# Patient Record
(demographics unavailable — no encounter records)

---

## 2025-01-08 NOTE — HISTORY OF PRESENT ILLNESS
[FreeTextEntry1] : 71 yo female here for repeat bw and bp chk states has noted some high bps when monitoring at home no longer taking hct daily due to declining GFR, takes prn w/ leg swelling after flying admits to 2 slices of muenster cheese daily

## 2025-01-08 NOTE — PHYSICAL EXAM
[No Acute Distress] : no acute distress [Normal Sclera/Conjunctiva] : normal sclera/conjunctiva [EOMI] : extraocular movements intact [Normal Outer Ear/Nose] : the outer ears and nose were normal in appearance [No JVD] : no jugular venous distention [Normal] : normal rate, regular rhythm, normal S1 and S2 and no murmur heard [Coordination Grossly Intact] : coordination grossly intact [Normal Affect] : the affect was normal [Alert and Oriented x3] : oriented to person, place, and time [Normal Insight/Judgement] : insight and judgment were intact [de-identified] : erythema noted to b/l LE

## 2025-03-06 NOTE — HEALTH RISK ASSESSMENT
[Yes] : Yes [Monthly or less (1 pt)] : Monthly or less (1 point) [1 or 2 (0 pts)] : 1 or 2 (0 points) [Never (0 pts)] : Never (0 points) [0] : 2) Feeling down, depressed, or hopeless: Not at all (0) [PHQ-2 Negative - No further assessment needed] : PHQ-2 Negative - No further assessment needed [No] : In the past 12 months have you used drugs other than those required for medical reasons? No [de-identified] : denying  [Audit-CScore] : 1 [[id number]] : 0

## 2025-03-06 NOTE — HEALTH RISK ASSESSMENT
[Yes] : Yes [Monthly or less (1 pt)] : Monthly or less (1 point) [1 or 2 (0 pts)] : 1 or 2 (0 points) [Never (0 pts)] : Never (0 points) [0] : 2) Feeling down, depressed, or hopeless: Not at all (0) [PHQ-2 Negative - No further assessment needed] : PHQ-2 Negative - No further assessment needed [No] : In the past 12 months have you used drugs other than those required for medical reasons? No [de-identified] : denying  [Audit-CScore] : 1 [[id number]] : 0

## 2025-03-06 NOTE — HISTORY OF PRESENT ILLNESS
[FreeTextEntry1] : follow up  [de-identified] : 03/05/2025   70 year old female   presents for follow up  C/o: BP check  stopped hCTZ-- restarted started on olmesartan  PMH : HTN , breast ca, neuropathy, HLD, ,  PSH: breast lumpectomy, colon resection , tooth extraction, knee surgery   Allergies: PCN- anaphylactic rx/ strawberries- Hives Meds: olmesartan 20, rosuvastatin 10, , gabapentin  metoprolol 50 - am  hctz prn only  naltrex- helps with pain s/p fall 4 yrs ago - saw pod specialist  sees neurologist- Dr Hartmann- 400 bid  Supplements: multiv , fish oil align probiotic-      2022 68 yr old female presents for follow up health maintenance:  received flu vaccine-   Allergies:  PCN- anaphylactic rx strawberries- Hives

## 2025-03-06 NOTE — PLAN
[FreeTextEntry1] : All problems, medications and allergies were reviewed with the patient and any adjustments necessary were made. The patients' blood was drawn in office and will be followed and assessed for any issues. Patient was told to notify to the office if any issues arise. Patient agreeable with plan    continue BP medication   educated re S&S of hyper and hypo tension   and low salt low fat diet   Bp log  monitor and report

## 2025-03-06 NOTE — HISTORY OF PRESENT ILLNESS
[FreeTextEntry1] : follow up  [de-identified] : 03/05/2025   70 year old female   presents for follow up  C/o: BP check  stopped hCTZ-- restarted started on olmesartan  PMH : HTN , breast ca, neuropathy, HLD, ,  PSH: breast lumpectomy, colon resection , tooth extraction, knee surgery   Allergies: PCN- anaphylactic rx/ strawberries- Hives Meds: olmesartan 20, rosuvastatin 10, , gabapentin  metoprolol 50 - am  hctz prn only  naltrex- helps with pain s/p fall 4 yrs ago - saw pod specialist  sees neurologist- Dr Hartmann- 400 bid  Supplements: multiv , fish oil align probiotic-      2022 68 yr old female presents for follow up health maintenance:  received flu vaccine-   Allergies:  PCN- anaphylactic rx strawberries- Hives

## 2025-05-13 NOTE — HISTORY OF PRESENT ILLNESS
[Result of repetitive motion] : result of repetitive motion [7] : 7 [0] : 0 [Dull/Aching] : dull/aching [Tightness] : tightness [Occasional] : occasional [Social interactions] : social interactions [Retired] : Work status: retired [de-identified] : 05/13/2025: KAREN RUBINSTEIN, a 71 year old female, presents today for left ankle pain for a few weeks without trauma. [] : Post Surgical Visit: no [FreeTextEntry5] : pain for 2 weeks , no injury , swelling just at night  history of neuropathy   [FreeTextEntry6] : weak

## 2025-05-13 NOTE — DISCUSSION/SUMMARY
[de-identified] : The patient has one or more conditions that would benefit from physical therapy.  The physical therapy is requested to improve any deficit in pain, range of motion, strength and other problems in the affected body part(s) as noted in the physical examination above.  A prescription for physical therapy 2 - 3 times per week for 6 weeks was prescribed for the following body parts. Left ankle.   The patient's orthopaedic condition(s) warrants intermittent use of a prescription strength non-steroidal anti-inflammatory medication (IBUPROFEN 600MG Po Bid PRN).  This medication is associated with risks including but not limited to gastrointestinal irritation, kidney damage, hypertension, and bleeding.  The patient understands and will take medication as prescribed.  The patient will stop the medication and consult a physician as needed if problems arise.  Will schedule the patient for a doppler of the left lower extremity to assess the problem pending insurance authorization if necessary.  The patient has continued symptoms despite various treatment modalities.  The doppler will help to clarify the diagnosis and subsequent treatment plan - rule out DVT.   Follow up in 4-6 weeks.  Will consider MRI if pain persists.  Will consider MRI if symptoms persist.

## 2025-05-13 NOTE — ASSESSMENT
[FreeTextEntry1] : Left ankle pain for 2 weeks with no injury. Minimal pain but swelling and weakness per patient. Possible peroneal tendon tear, possible CRPS flare up, possible DVT.  Patient states she "erythromelalgia" and treated with neuro which causes chronic redness. .  At this point, this is a new condition where the diagnosis and the long-term prognosis is uncertain.  Retired.  HTN, neuropathy and erythromelalgia vs CPRS in feet after broke right knee - treated by neuro.

## 2025-05-13 NOTE — PHYSICAL EXAM
[Left] : left foot and ankle [Mild] : mild diffused ankle swelling [NL (20)] : dorsiflexion 20 degrees [NL (40)] : plantar flexion 40 degrees [4___] : [facility] 4[unfilled]/5 [2+] : dorsalis pedis pulse: 2+ [] : Sensation present to light touch in all distributions [FreeTextEntry3] : MIld erythema but bilaterally [TWNoteComboBox7] : dorsiflexion 5 degrees [de-identified] : plantar flexion 25 degrees [de-identified] : inversion 20 degrees [de-identified] : eversion 10 degrees

## 2025-07-01 NOTE — HISTORY OF PRESENT ILLNESS
[8] : 8 [4] : 4 [Dull/Aching] : dull/aching [de-identified] : R hand/ wrist pain after falling on it last night    [FreeTextEntry1] : R hand/wrist

## 2025-07-01 NOTE — ASSESSMENT
[FreeTextEntry1] : This is an acute uncomplicated injury that needs brace, ice and NSAIDs Wrist brace provided  I recommend the patient take over the counter medication such as Advil/Motrin/Aleve or Tylenol for pain and inflammation I rec the patient ice the affected body part for 10-15mins 3-4 times a day for 5 days to reduce pain, swelling, and inflammation  Return prn   
no ear pain/no nasal congestion/no vertigo

## 2025-07-01 NOTE — REASON FOR VISIT
[FreeTextEntry2] : 07/01/2025: 71-year-old female here today for: new injury R hand/ wrist pain. Went to  a pizza last night, that has a big box. Box got caught in the door and she fell. HAs been having a lot of pain since.

## 2025-07-27 NOTE — PLAN
[FreeTextEntry1] : All problems, medications and allergies were reviewed with the patient and any adjustments necessary were made. The patients' blood was drawn in office and will be followed and assessed for any issues. Patient was told to notify to the office if any issues arise. Patient agreeable with plan    continue BP medication   educated re S&S of hyper and hypo tension   and low salt low fat diet   Bp log  monitor and report  cardiology - will return -

## 2025-07-27 NOTE — HISTORY OF PRESENT ILLNESS
[FreeTextEntry8] : Jul 25 2025  9:00AM   71 year  old female      presents for acute care visit  decreased appetite, bloated,  usually skips breakfast-  has protein for lunch omeprazole every other day  for acid reflux symptoms   - 2023- colonoscopy- 5 yr recall BMs- regular   - thy nodules- sees Dr Kidd- does serial US   PMH : HTN , breast ca, neuropathy, HLD, , PSH: breast lumpectomy, colon resection , tooth extraction, knee surgery  Allergies: PCN- anaphylactic rx/ strawberries- Hives Meds: olmesartan 20, rosuvastatin 10, , gabapentin metoprolol 50 - am hctz prn only saline nasal spray prn -  naltrex- helps with pain s/p fall 4 yrs ago - saw pod specialist sees neurologist- Dr Hartmann- 400 bid Supplements: multiv , fish oil align probiotic-  C/o:  feeling forgetful - but denying any others noticing any memory impairment   denying snoring -   [de-identified] : 03/05/2025   70 year old female   presents for follow up  C/o: BP check  stopped hCTZ-- restarted started on olmesartan  PMH : HTN , breast ca, neuropathy, HLD, ,  PSH: breast lumpectomy, colon resection , tooth extraction, knee surgery   Allergies: PCN- anaphylactic rx/ strawberries- Hives Meds: olmesartan 20, rosuvastatin 10, , gabapentin  metoprolol 50 - am  hctz prn only  naltrex- helps with pain s/p fall 4 yrs ago - saw pod specialist  sees neurologist- Dr Hartmann- 400 bid  Supplements: multiv , fish oil align probiotic-      2022 68 yr old female presents for follow up health maintenance:  received flu vaccine-   Allergies:  PCN- anaphylactic rx strawberries- Hives

## 2025-07-27 NOTE — HEALTH RISK ASSESSMENT
[Yes] : Yes [Monthly or less (1 pt)] : Monthly or less (1 point) [1 or 2 (0 pts)] : 1 or 2 (0 points) [Never (0 pts)] : Never (0 points) [No] : In the past 12 months have you used drugs other than those required for medical reasons? No [0] : 2) Feeling down, depressed, or hopeless: Not at all (0) [PHQ-2 Negative - No further assessment needed] : PHQ-2 Negative - No further assessment needed [de-identified] : denying  [Audit-CScore] : 1 [de-identified] : bike and treadmill at gym,   [de-identified] : regular [[id number]] : 0

## 2025-07-27 NOTE — HISTORY OF PRESENT ILLNESS
[FreeTextEntry8] : Jul 25 2025  9:00AM   71 year  old female      presents for acute care visit  decreased appetite, bloated,  usually skips breakfast-  has protein for lunch omeprazole every other day  for acid reflux symptoms   - 2023- colonoscopy- 5 yr recall BMs- regular   - thy nodules- sees Dr Kidd- does serial US   PMH : HTN , breast ca, neuropathy, HLD, , PSH: breast lumpectomy, colon resection , tooth extraction, knee surgery  Allergies: PCN- anaphylactic rx/ strawberries- Hives Meds: olmesartan 20, rosuvastatin 10, , gabapentin metoprolol 50 - am hctz prn only saline nasal spray prn -  naltrex- helps with pain s/p fall 4 yrs ago - saw pod specialist sees neurologist- Dr Hartmann- 400 bid Supplements: multiv , fish oil align probiotic-  C/o:  feeling forgetful - but denying any others noticing any memory impairment   denying snoring -   [de-identified] : 03/05/2025   70 year old female   presents for follow up  C/o: BP check  stopped hCTZ-- restarted started on olmesartan  PMH : HTN , breast ca, neuropathy, HLD, ,  PSH: breast lumpectomy, colon resection , tooth extraction, knee surgery   Allergies: PCN- anaphylactic rx/ strawberries- Hives Meds: olmesartan 20, rosuvastatin 10, , gabapentin  metoprolol 50 - am  hctz prn only  naltrex- helps with pain s/p fall 4 yrs ago - saw pod specialist  sees neurologist- Dr Hartmann- 400 bid  Supplements: multiv , fish oil align probiotic-      2022 68 yr old female presents for follow up health maintenance:  received flu vaccine-   Allergies:  PCN- anaphylactic rx strawberries- Hives

## 2025-07-27 NOTE — HEALTH RISK ASSESSMENT
[Yes] : Yes [Monthly or less (1 pt)] : Monthly or less (1 point) [1 or 2 (0 pts)] : 1 or 2 (0 points) [Never (0 pts)] : Never (0 points) [No] : In the past 12 months have you used drugs other than those required for medical reasons? No [0] : 2) Feeling down, depressed, or hopeless: Not at all (0) [PHQ-2 Negative - No further assessment needed] : PHQ-2 Negative - No further assessment needed [de-identified] : denying  [Audit-CScore] : 1 [de-identified] : bike and treadmill at gym,   [de-identified] : regular [[id number]] : 0